# Patient Record
Sex: MALE | URBAN - METROPOLITAN AREA
[De-identification: names, ages, dates, MRNs, and addresses within clinical notes are randomized per-mention and may not be internally consistent; named-entity substitution may affect disease eponyms.]

---

## 2024-01-11 ENCOUNTER — OFFICE VISIT (OUTPATIENT)
Dept: URBAN - METROPOLITAN AREA CLINIC 84 | Facility: CLINIC | Age: 39
End: 2024-01-11
Payer: COMMERCIAL

## 2024-01-11 ENCOUNTER — LAB OUTSIDE AN ENCOUNTER (OUTPATIENT)
Dept: URBAN - METROPOLITAN AREA CLINIC 84 | Facility: CLINIC | Age: 39
End: 2024-01-11

## 2024-01-11 ENCOUNTER — DASHBOARD ENCOUNTERS (OUTPATIENT)
Age: 39
End: 2024-01-11

## 2024-01-11 VITALS
WEIGHT: 250.6 LBS | HEART RATE: 71 BPM | SYSTOLIC BLOOD PRESSURE: 111 MMHG | TEMPERATURE: 97.8 F | HEIGHT: 71 IN | DIASTOLIC BLOOD PRESSURE: 70 MMHG | BODY MASS INDEX: 35.08 KG/M2

## 2024-01-11 DIAGNOSIS — R10.11 RIGHT UPPER QUADRANT ABDOMINAL PAIN: ICD-10-CM

## 2024-01-11 DIAGNOSIS — R10.13 DYSPEPSIA: ICD-10-CM

## 2024-01-11 PROCEDURE — 99204 OFFICE O/P NEW MOD 45 MIN: CPT | Performed by: INTERNAL MEDICINE

## 2024-01-11 RX ORDER — ALBUTEROL SULFATE 108 UG/1
INHALE 2 PUFFS INTO THE LUNGS EVERY 6 HOURS AS NEEDED FOR WHEEZING OR SHORTNESS OF BREATH INHALANT RESPIRATORY (INHALATION)
Qty: 6.7 GRAM | Refills: 1 | Status: ACTIVE | COMMUNITY

## 2024-01-11 NOTE — HPI-TODAY'S VISIT:
Jan 2024 visit : Referal indicates GERD , diarrhea and lactose intolerance. chronic GI issues for approx 8 yrs. he has changed his deit. tomato based foods cause bloating and reflux. cut back on alcohol. no heartburn. some foods cause discomfort in upper right side / epigastric area. no dysphagia. regular bowels. occasional constipation. No known history of colon cancer / GI malignancies / IBD in first degree family members.. no melena / rectal bleeding. pain lasts for upto 24 hrs. no nocturnal symptoms. pcp did labs late 2023 and everything was normal.
 Delivery

## 2024-02-08 ENCOUNTER — EGD (OUTPATIENT)
Dept: URBAN - METROPOLITAN AREA SURGERY CENTER 20 | Facility: SURGERY CENTER | Age: 39
End: 2024-02-08

## 2024-02-15 ENCOUNTER — EGD (OUTPATIENT)
Dept: URBAN - METROPOLITAN AREA SURGERY CENTER 20 | Facility: SURGERY CENTER | Age: 39
End: 2024-02-15
Payer: COMMERCIAL

## 2024-02-15 ENCOUNTER — LAB (OUTPATIENT)
Dept: URBAN - METROPOLITAN AREA CLINIC 4 | Facility: CLINIC | Age: 39
End: 2024-02-15
Payer: COMMERCIAL

## 2024-02-15 DIAGNOSIS — K31.89 OTHER DISEASES OF STOMACH AND DUODENUM: ICD-10-CM

## 2024-02-15 DIAGNOSIS — R10.13 ABDOMINAL DISCOMFORT, EPIGASTRIC: ICD-10-CM

## 2024-02-15 PROCEDURE — 88305 TISSUE EXAM BY PATHOLOGIST: CPT | Performed by: PATHOLOGY

## 2024-02-15 PROCEDURE — 43239 EGD BIOPSY SINGLE/MULTIPLE: CPT | Performed by: INTERNAL MEDICINE

## 2024-02-15 PROCEDURE — 88312 SPECIAL STAINS GROUP 1: CPT | Performed by: PATHOLOGY

## 2024-02-15 RX ORDER — ALBUTEROL SULFATE 108 UG/1
INHALE 2 PUFFS INTO THE LUNGS EVERY 6 HOURS AS NEEDED FOR WHEEZING OR SHORTNESS OF BREATH INHALANT RESPIRATORY (INHALATION)
Qty: 6.7 GRAM | Refills: 1 | Status: ACTIVE | COMMUNITY

## 2024-04-18 ENCOUNTER — TELPHEP (OUTPATIENT)
Dept: URBAN - METROPOLITAN AREA TELEHEALTH 2 | Facility: TELEHEALTH | Age: 39
End: 2024-04-18
Payer: COMMERCIAL

## 2024-04-18 DIAGNOSIS — R14.0 ABDOMINAL BLOATING: ICD-10-CM

## 2024-04-18 DIAGNOSIS — R10.13 DYSPEPSIA: ICD-10-CM

## 2024-04-18 PROCEDURE — 99213 OFFICE O/P EST LOW 20 MIN: CPT

## 2024-04-18 RX ORDER — ALBUTEROL SULFATE 108 UG/1
INHALE 2 PUFFS INTO THE LUNGS EVERY 6 HOURS AS NEEDED FOR WHEEZING OR SHORTNESS OF BREATH INHALANT RESPIRATORY (INHALATION)
Qty: 6.7 GRAM | Refills: 1 | Status: ACTIVE | COMMUNITY

## 2024-04-18 NOTE — HPI-TODAY'S VISIT:
04/24 Telephone Visit  S/p EGD and US, both of which were unremarkable, RUQ US exam was limited, but also unremarkable. Symptoms of RUQ abdominal pain have subsided, GERD well managed w/ new dietary modifications, however, the patient continues to feel bloated and have gas issues. Certain triggers i.e onions, lactose, previously using yogurt probiotics but cut out d/t gas issues w/ dairy. PPI use prn, weeks since last dose, reflux much better.    EGD 2024  - Z-line regular, 38 cm from the incisors.- No gross lesions in the entire esophagus.- No gross lesions in the entire stomach. Biopsied.- Normal examined duodenum. Biopsied.- Gastroesophageal flap valve classified as Hill Grade I (prominent fold, tight to endoscope).

## 2024-04-18 NOTE — HPI-OTHER HISTORIES
Jan 2024 visit : Referal indicates GERD , diarrhea and lactose intolerance. chronic GI issues for approx 8 yrs. he has changed his deit. tomato based foods cause bloating and reflux. cut back on alcohol. no heartburn. some foods cause discomfort in upper right side / epigastric area. no dysphagia. regular bowels. occasional constipation. No known history of colon cancer / GI malignancies / IBD in first degree family members.. no melena / rectal bleeding. pain lasts for upto 24 hrs. no nocturnal symptoms. pcp did labs late 2023 and everything was normal.

## 2024-07-01 ENCOUNTER — OFFICE VISIT (OUTPATIENT)
Dept: URBAN - METROPOLITAN AREA CLINIC 84 | Facility: CLINIC | Age: 39
End: 2024-07-01

## 2024-07-29 ENCOUNTER — OFFICE VISIT (OUTPATIENT)
Dept: URBAN - METROPOLITAN AREA CLINIC 84 | Facility: CLINIC | Age: 39
End: 2024-07-29
Payer: COMMERCIAL

## 2024-07-29 VITALS
HEIGHT: 71 IN | SYSTOLIC BLOOD PRESSURE: 121 MMHG | HEART RATE: 66 BPM | DIASTOLIC BLOOD PRESSURE: 78 MMHG | WEIGHT: 255.2 LBS | TEMPERATURE: 97 F | BODY MASS INDEX: 35.73 KG/M2

## 2024-07-29 DIAGNOSIS — R14.0 ABDOMINAL BLOATING: ICD-10-CM

## 2024-07-29 DIAGNOSIS — R10.13 DYSPEPSIA: ICD-10-CM

## 2024-07-29 PROCEDURE — 99213 OFFICE O/P EST LOW 20 MIN: CPT

## 2024-07-29 RX ORDER — ALBUTEROL SULFATE 108 UG/1
INHALE 2 PUFFS INTO THE LUNGS EVERY 6 HOURS AS NEEDED FOR WHEEZING OR SHORTNESS OF BREATH INHALANT RESPIRATORY (INHALATION)
Qty: 6.7 GRAM | Refills: 1 | Status: ACTIVE | COMMUNITY

## 2024-07-29 NOTE — HPI-TODAY'S VISIT:
07/24 OV  Dietary modifications have kept reflux under control, with no PPI as of late. The SIBO is incomplete; the patient reports a low FODMAP diet has been very beneficial, no issues w/ erutcation or bloating. No BRBPR, melena, unintentional weight loss, abdominal pain, SOB/CP, dysphagia, or odynophagia.

## 2024-07-29 NOTE — HPI-OTHER HISTORIES
04/24 Telephone Visit  S/p EGD and US, both of which were unremarkable, RUQ US exam was limited, but also unremarkable. Symptoms of RUQ abdominal pain have subsided, GERD well managed w/ new dietary modifications, however, the patient continues to feel bloated and have gas issues. Certain triggers i.e onions, lactose, previously using yogurt probiotics but cut out d/t gas issues w/ dairy. PPI use prn, weeks since last dose, reflux much better.    EGD 2024  - Z-line regular, 38 cm from the incisors.- No gross lesions in the entire esophagus.- No gross lesions in the entire stomach. Biopsied.- Normal examined duodenum. Biopsied.- Gastroesophageal flap valve classified as Hill Grade I (prominent fold, tight to endoscope).  Jan 2024 visit : Referal indicates GERD , diarrhea and lactose intolerance. chronic GI issues for approx 8 yrs. he has changed his deit. tomato based foods cause bloating and reflux. cut back on alcohol. no heartburn. some foods cause discomfort in upper right side / epigastric area. no dysphagia. regular bowels. occasional constipation. No known history of colon cancer / GI malignancies / IBD in first degree family members.. no melena / rectal bleeding. pain lasts for upto 24 hrs. no nocturnal symptoms. pcp did labs late 2023 and everything was normal.

## 2025-01-28 ENCOUNTER — OFFICE VISIT (OUTPATIENT)
Dept: URBAN - METROPOLITAN AREA CLINIC 84 | Facility: CLINIC | Age: 40
End: 2025-01-28

## 2025-03-17 ENCOUNTER — OFFICE VISIT (OUTPATIENT)
Dept: URBAN - METROPOLITAN AREA CLINIC 84 | Facility: CLINIC | Age: 40
End: 2025-03-17

## 2025-04-14 ENCOUNTER — OFFICE VISIT (OUTPATIENT)
Dept: URBAN - METROPOLITAN AREA CLINIC 84 | Facility: CLINIC | Age: 40
End: 2025-04-14